# Patient Record
Sex: FEMALE | Race: WHITE | NOT HISPANIC OR LATINO | Employment: UNEMPLOYED | URBAN - METROPOLITAN AREA
[De-identification: names, ages, dates, MRNs, and addresses within clinical notes are randomized per-mention and may not be internally consistent; named-entity substitution may affect disease eponyms.]

---

## 2021-05-27 LAB
ABO GROUP BLD: NORMAL
BLD GP AB SCN SERPL QL: NEGATIVE
HBV SURFACE AG SERPL QL IA: NORMAL
HIV 1+2 AB+HIV1 P24 AG SERPL QL IA: NORMAL
RH BLD: NORMAL
RUBV IGG SERPL IA-ACNC: NORMAL
TREPONEMA PALLIDUM IGG+IGM AB [PRESENCE] IN SERUM OR PLASMA BY IMMUNOASSAY: NON REACTIVE

## 2021-10-06 ENCOUNTER — HOSPITAL ENCOUNTER (OUTPATIENT)
Facility: MEDICAL CENTER | Age: 26
End: 2021-10-06
Attending: SPECIALIST
Payer: COMMERCIAL

## 2021-10-06 PROCEDURE — 87491 CHLMYD TRACH DNA AMP PROBE: CPT

## 2021-10-06 PROCEDURE — 87624 HPV HI-RISK TYP POOLED RSLT: CPT

## 2021-10-06 PROCEDURE — 87591 N.GONORRHOEAE DNA AMP PROB: CPT

## 2021-10-06 PROCEDURE — 88175 CYTOPATH C/V AUTO FLUID REDO: CPT

## 2021-10-08 LAB
C TRACH DNA GENITAL QL NAA+PROBE: NEGATIVE
CYTOLOGY REG CYTOL: NORMAL
HPV HR 12 DNA CVX QL NAA+PROBE: NEGATIVE
HPV16 DNA SPEC QL NAA+PROBE: NEGATIVE
HPV18 DNA SPEC QL NAA+PROBE: NEGATIVE
N GONORRHOEA DNA GENITAL QL NAA+PROBE: NEGATIVE
SPECIMEN SOURCE: NORMAL
SPECIMEN SOURCE: NORMAL

## 2022-02-25 LAB — GLUCOSE 3H P CHAL SERPL-MCNC: 88 MG/DL

## 2022-03-24 ENCOUNTER — HOSPITAL ENCOUNTER (OUTPATIENT)
Facility: MEDICAL CENTER | Age: 27
End: 2022-03-24
Attending: SPECIALIST
Payer: COMMERCIAL

## 2022-03-24 LAB — FIBRONECTIN FETAL SPEC QL: NEGATIVE

## 2022-03-24 PROCEDURE — 82731 ASSAY OF FETAL FIBRONECTIN: CPT

## 2022-04-06 LAB — GP B STREP DNA SPEC QL NAA+PROBE: NEGATIVE

## 2022-04-28 NOTE — H&P
DATE OF ADMISSION:  2022     REASON FOR ADMISSION:  Prodromal labor.     HISTORY OF PRESENT ILLNESS:  This is a 27-year-old  2, para 1 at 39-1/7   weeks' gestation based on last menstrual period consistent with a 9-week   ultrasound, now scheduled for augmentation of labor with Pitocin per protocol   given prodromal labor symptoms.  Risks, benefits and alternatives have been   addressed.  She has asked appropriate questions and wished to proceed forward   with admission at this time.     PAST MEDICAL HISTORY:  Noncontributory.     PAST SURGICAL HISTORY:  Tonsillectomy in .     OBSTETRICAL HISTORY:  In 2016 at 39 weeks gestation, after a 16-hour labor,   the patient underwent a spontaneous vaginal delivery of a 7 pound 8 ounce male   infant.  This is her second pregnancy.     SOCIAL HISTORY:  She denies use of any alcohol, tobacco or recreational drug   use.     MEDICATIONS:  Prenatal vitamins.     ALLERGIES:  No known drug allergies.     PHYSICAL EXAMINATION:   VITAL SIGNS:  She is afebrile, hemodynamically stable.  Current vital signs   can be seen in electronic medical record.  HEART:  Regular rate and rhythm.  CHEST:  Clear to auscultation bilaterally.  ABDOMEN:  Soft, gravid, nontender.  PELVIC:  Sterile vaginal exam 1, 50%, -3 station.  EXTREMITIES:  Nontender.     LABORATORY DATA:  Prenatal care labs are all in order.  She is group B strep   negative.  She did have an abnormal 1-hour glucose tolerance test of 152 with   a normal 3-hour glucose tolerance test.  She has also had multiple urinary   tract infections during the course of her pregnancy and is now just currently   finishing up a recent course of antibiotic with the use of cefdinir.     ASSESSMENT AND PLAN:  A 27-year-old  2, para 1 at term with overall   reassuring fetal status with excellent dates with care since 9 weeks gestation   electing to proceed forward with augmentation of labor with Pitocin per   protocol given  excellent dates and a favorable cervix with Pitocin per   protocol.        ______________________________  MD YVETTE Stark/FRANCY/MARICRUZ    DD:  04/28/2022 13:42  DT:  04/28/2022 14:26    Job#:  [id number]

## 2022-04-30 ENCOUNTER — HOSPITAL ENCOUNTER (EMERGENCY)
Facility: MEDICAL CENTER | Age: 27
End: 2022-04-30
Attending: SPECIALIST | Admitting: SPECIALIST
Payer: COMMERCIAL

## 2022-04-30 VITALS
RESPIRATION RATE: 20 BRPM | OXYGEN SATURATION: 98 % | BODY MASS INDEX: 35.17 KG/M2 | TEMPERATURE: 97.4 F | HEART RATE: 98 BPM | DIASTOLIC BLOOD PRESSURE: 70 MMHG | SYSTOLIC BLOOD PRESSURE: 129 MMHG | WEIGHT: 206 LBS | HEIGHT: 64 IN

## 2022-04-30 LAB
APPEARANCE UR: CLEAR
COLOR UR AUTO: YELLOW
GLUCOSE UR QL STRIP.AUTO: NEGATIVE MG/DL
KETONES UR QL STRIP.AUTO: NEGATIVE MG/DL
LEUKOCYTE ESTERASE UR QL STRIP.AUTO: NEGATIVE
NITRITE UR QL STRIP.AUTO: NEGATIVE
PH UR STRIP.AUTO: 6 [PH] (ref 5–8)
PROT UR QL STRIP: NEGATIVE MG/DL
RBC UR QL AUTO: NEGATIVE
SP GR UR STRIP.AUTO: 1.01 (ref 1–1.03)

## 2022-04-30 PROCEDURE — 81002 URINALYSIS NONAUTO W/O SCOPE: CPT

## 2022-04-30 PROCEDURE — 59025 FETAL NON-STRESS TEST: CPT

## 2022-04-30 PROCEDURE — 302449 STATCHG TRIAGE ONLY (STATISTIC)

## 2022-04-30 NOTE — PROGRESS NOTES
0320 - Pt arrives with c/o lower pelvic pain, onset after visit in office on Thursday but worsening tonight with strong irregular contractions. Pt denies any LOF/bleeding, +FM. EFM connected. Pt also states she is on cefdinir for an ongoing UTI. Urine sample collected. SVE 1/thick/-2, vertex.  0510 - SVE recheck, 2/thick/-2. MD Chacon called and given report of pt, labor status, and FHT, will recheck in 2 hours. PT updated and agrees with plan of care. Pt provided with birthing ball and socks for ambulation.  0700 - Report given to INGA Judd.

## 2022-05-03 ENCOUNTER — HOSPITAL ENCOUNTER (INPATIENT)
Facility: MEDICAL CENTER | Age: 27
LOS: 2 days | End: 2022-05-06
Attending: SPECIALIST | Admitting: SPECIALIST
Payer: COMMERCIAL

## 2022-05-03 PROCEDURE — 85025 COMPLETE CBC W/AUTO DIFF WBC: CPT

## 2022-05-03 PROCEDURE — 36415 COLL VENOUS BLD VENIPUNCTURE: CPT

## 2022-05-03 ASSESSMENT — PAIN SCALES - GENERAL: PAINLEVEL: 0 - NO PAIN

## 2022-05-04 ENCOUNTER — ANESTHESIA EVENT (OUTPATIENT)
Dept: ANESTHESIOLOGY | Facility: MEDICAL CENTER | Age: 27
End: 2022-05-04
Payer: COMMERCIAL

## 2022-05-04 ENCOUNTER — ANESTHESIA (OUTPATIENT)
Dept: ANESTHESIOLOGY | Facility: MEDICAL CENTER | Age: 27
End: 2022-05-04
Payer: COMMERCIAL

## 2022-05-04 LAB
BASOPHILS # BLD AUTO: 0.6 % (ref 0–1.8)
BASOPHILS # BLD: 0.07 K/UL (ref 0–0.12)
EOSINOPHIL # BLD AUTO: 0.11 K/UL (ref 0–0.51)
EOSINOPHIL NFR BLD: 1 % (ref 0–6.9)
ERYTHROCYTE [DISTWIDTH] IN BLOOD BY AUTOMATED COUNT: 42.7 FL (ref 35.9–50)
HCT VFR BLD AUTO: 39.7 % (ref 37–47)
HGB BLD-MCNC: 12.9 G/DL (ref 12–16)
HOLDING TUBE BB 8507: NORMAL
IMM GRANULOCYTES # BLD AUTO: 0.18 K/UL (ref 0–0.11)
IMM GRANULOCYTES NFR BLD AUTO: 1.6 % (ref 0–0.9)
LYMPHOCYTES # BLD AUTO: 2.84 K/UL (ref 1–4.8)
LYMPHOCYTES NFR BLD: 26 % (ref 22–41)
MCH RBC QN AUTO: 28 PG (ref 27–33)
MCHC RBC AUTO-ENTMCNC: 32.5 G/DL (ref 33.6–35)
MCV RBC AUTO: 86.3 FL (ref 81.4–97.8)
MONOCYTES # BLD AUTO: 0.84 K/UL (ref 0–0.85)
MONOCYTES NFR BLD AUTO: 7.7 % (ref 0–13.4)
NEUTROPHILS # BLD AUTO: 6.89 K/UL (ref 2–7.15)
NEUTROPHILS NFR BLD: 63.1 % (ref 44–72)
NRBC # BLD AUTO: 0 K/UL
NRBC BLD-RTO: 0 /100 WBC
PLATELET # BLD AUTO: 283 K/UL (ref 164–446)
PMV BLD AUTO: 11.3 FL (ref 9–12.9)
RBC # BLD AUTO: 4.6 M/UL (ref 4.2–5.4)
WBC # BLD AUTO: 10.9 K/UL (ref 4.8–10.8)

## 2022-05-04 PROCEDURE — 700101 HCHG RX REV CODE 250: Performed by: ANESTHESIOLOGY

## 2022-05-04 PROCEDURE — 62322 NJX INTERLAMINAR LMBR/SAC: CPT | Mod: 59 | Performed by: ANESTHESIOLOGY

## 2022-05-04 PROCEDURE — 10907ZC DRAINAGE OF AMNIOTIC FLUID, THERAPEUTIC FROM PRODUCTS OF CONCEPTION, VIA NATURAL OR ARTIFICIAL OPENING: ICD-10-PCS | Performed by: SPECIALIST

## 2022-05-04 PROCEDURE — 700105 HCHG RX REV CODE 258: Performed by: SPECIALIST

## 2022-05-04 PROCEDURE — 10H07YZ INSERTION OF OTHER DEVICE INTO PRODUCTS OF CONCEPTION, VIA NATURAL OR ARTIFICIAL OPENING: ICD-10-PCS | Performed by: SPECIALIST

## 2022-05-04 PROCEDURE — 700105 HCHG RX REV CODE 258: Performed by: ANESTHESIOLOGY

## 2022-05-04 PROCEDURE — 700111 HCHG RX REV CODE 636 W/ 250 OVERRIDE (IP): Performed by: ANESTHESIOLOGY

## 2022-05-04 PROCEDURE — 0UQMXZZ REPAIR VULVA, EXTERNAL APPROACH: ICD-10-PCS | Performed by: SPECIALIST

## 2022-05-04 PROCEDURE — 59409 OBSTETRICAL CARE: CPT

## 2022-05-04 PROCEDURE — 304965 HCHG RECOVERY SERVICES

## 2022-05-04 PROCEDURE — A9270 NON-COVERED ITEM OR SERVICE: HCPCS | Performed by: SPECIALIST

## 2022-05-04 PROCEDURE — 700111 HCHG RX REV CODE 636 W/ 250 OVERRIDE (IP): Performed by: SPECIALIST

## 2022-05-04 PROCEDURE — 303615 HCHG EPIDURAL/SPINAL ANESTHESIA FOR LABOR

## 2022-05-04 PROCEDURE — 770002 HCHG ROOM/CARE - OB PRIVATE (112)

## 2022-05-04 PROCEDURE — 36415 COLL VENOUS BLD VENIPUNCTURE: CPT

## 2022-05-04 PROCEDURE — 01967 NEURAXL LBR ANES VAG DLVR: CPT | Performed by: ANESTHESIOLOGY

## 2022-05-04 PROCEDURE — 3E033VJ INTRODUCTION OF OTHER HORMONE INTO PERIPHERAL VEIN, PERCUTANEOUS APPROACH: ICD-10-PCS | Performed by: SPECIALIST

## 2022-05-04 PROCEDURE — 700102 HCHG RX REV CODE 250 W/ 637 OVERRIDE(OP): Performed by: SPECIALIST

## 2022-05-04 RX ORDER — ACETAMINOPHEN 500 MG
1000 TABLET ORAL EVERY 6 HOURS PRN
Status: DISCONTINUED | OUTPATIENT
Start: 2022-05-04 | End: 2022-05-06 | Stop reason: HOSPADM

## 2022-05-04 RX ORDER — SODIUM CHLORIDE, SODIUM LACTATE, POTASSIUM CHLORIDE, CALCIUM CHLORIDE 600; 310; 30; 20 MG/100ML; MG/100ML; MG/100ML; MG/100ML
INJECTION, SOLUTION INTRAVENOUS PRN
Status: DISCONTINUED | OUTPATIENT
Start: 2022-05-04 | End: 2022-05-06 | Stop reason: HOSPADM

## 2022-05-04 RX ORDER — VITAMIN A ACETATE, BETA CAROTENE, ASCORBIC ACID, CHOLECALCIFEROL, .ALPHA.-TOCOPHEROL ACETATE, DL-, THIAMINE MONONITRATE, RIBOFLAVIN, NIACINAMIDE, PYRIDOXINE HYDROCHLORIDE, FOLIC ACID, CYANOCOBALAMIN, CALCIUM CARBONATE, FERROUS FUMARATE, ZINC OXIDE, CUPRIC OXIDE 3080; 12; 120; 400; 1; 1.84; 3; 20; 22; 920; 25; 200; 27; 10; 2 [IU]/1; UG/1; MG/1; [IU]/1; MG/1; MG/1; MG/1; MG/1; MG/1; [IU]/1; MG/1; MG/1; MG/1; MG/1; MG/1
1 TABLET, FILM COATED ORAL
Status: DISCONTINUED | OUTPATIENT
Start: 2022-05-04 | End: 2022-05-04

## 2022-05-04 RX ORDER — OXYTOCIN 10 [USP'U]/ML
10 INJECTION, SOLUTION INTRAMUSCULAR; INTRAVENOUS
Status: DISCONTINUED | OUTPATIENT
Start: 2022-05-04 | End: 2022-05-04 | Stop reason: HOSPADM

## 2022-05-04 RX ORDER — VITAMIN A ACETATE, BETA CAROTENE, ASCORBIC ACID, CHOLECALCIFEROL, .ALPHA.-TOCOPHEROL ACETATE, DL-, THIAMINE MONONITRATE, RIBOFLAVIN, NIACINAMIDE, PYRIDOXINE HYDROCHLORIDE, FOLIC ACID, CYANOCOBALAMIN, CALCIUM CARBONATE, FERROUS FUMARATE, ZINC OXIDE, CUPRIC OXIDE 3080; 12; 120; 400; 1; 1.84; 3; 20; 22; 920; 25; 200; 27; 10; 2 [IU]/1; UG/1; MG/1; [IU]/1; MG/1; MG/1; MG/1; MG/1; MG/1; [IU]/1; MG/1; MG/1; MG/1; MG/1; MG/1
1 TABLET, FILM COATED ORAL DAILY
Status: DISCONTINUED | OUTPATIENT
Start: 2022-05-05 | End: 2022-05-06 | Stop reason: HOSPADM

## 2022-05-04 RX ORDER — BUPIVACAINE HYDROCHLORIDE 2.5 MG/ML
INJECTION, SOLUTION EPIDURAL; INFILTRATION; INTRACAUDAL
Status: COMPLETED | OUTPATIENT
Start: 2022-05-04 | End: 2022-05-04

## 2022-05-04 RX ORDER — ONDANSETRON 2 MG/ML
INJECTION INTRAMUSCULAR; INTRAVENOUS
Status: ACTIVE
Start: 2022-05-04 | End: 2022-05-05

## 2022-05-04 RX ORDER — IBUPROFEN 800 MG/1
800 TABLET ORAL EVERY 8 HOURS PRN
Status: DISCONTINUED | OUTPATIENT
Start: 2022-05-04 | End: 2022-05-06 | Stop reason: HOSPADM

## 2022-05-04 RX ORDER — DOCUSATE SODIUM 100 MG/1
100 CAPSULE, LIQUID FILLED ORAL 2 TIMES DAILY PRN
Status: DISCONTINUED | OUTPATIENT
Start: 2022-05-04 | End: 2022-05-06 | Stop reason: HOSPADM

## 2022-05-04 RX ORDER — METHYLERGONOVINE MALEATE 0.2 MG/ML
0.2 INJECTION INTRAVENOUS
Status: DISCONTINUED | OUTPATIENT
Start: 2022-05-04 | End: 2022-05-06 | Stop reason: HOSPADM

## 2022-05-04 RX ORDER — BUPIVACAINE HYDROCHLORIDE 2.5 MG/ML
INJECTION, SOLUTION EPIDURAL; INFILTRATION; INTRACAUDAL
Status: COMPLETED
Start: 2022-05-04 | End: 2022-05-04

## 2022-05-04 RX ORDER — SODIUM CHLORIDE, SODIUM LACTATE, POTASSIUM CHLORIDE, CALCIUM CHLORIDE 600; 310; 30; 20 MG/100ML; MG/100ML; MG/100ML; MG/100ML
INJECTION, SOLUTION INTRAVENOUS CONTINUOUS
Status: DISCONTINUED | OUTPATIENT
Start: 2022-05-04 | End: 2022-05-06 | Stop reason: HOSPADM

## 2022-05-04 RX ORDER — ACETAMINOPHEN 500 MG
1000 TABLET ORAL
Status: DISCONTINUED | OUTPATIENT
Start: 2022-05-04 | End: 2022-05-04 | Stop reason: HOSPADM

## 2022-05-04 RX ORDER — MISOPROSTOL 200 UG/1
600 TABLET ORAL
Status: DISCONTINUED | OUTPATIENT
Start: 2022-05-04 | End: 2022-05-06 | Stop reason: HOSPADM

## 2022-05-04 RX ORDER — SODIUM CHLORIDE, SODIUM LACTATE, POTASSIUM CHLORIDE, AND CALCIUM CHLORIDE .6; .31; .03; .02 G/100ML; G/100ML; G/100ML; G/100ML
1000 INJECTION, SOLUTION INTRAVENOUS
Status: COMPLETED | OUTPATIENT
Start: 2022-05-04 | End: 2022-05-04

## 2022-05-04 RX ORDER — IBUPROFEN 800 MG/1
800 TABLET ORAL
Status: COMPLETED | OUTPATIENT
Start: 2022-05-04 | End: 2022-05-04

## 2022-05-04 RX ORDER — ROPIVACAINE HYDROCHLORIDE 2 MG/ML
INJECTION, SOLUTION EPIDURAL; INFILTRATION; PERINEURAL CONTINUOUS
Status: DISCONTINUED | OUTPATIENT
Start: 2022-05-04 | End: 2022-05-05 | Stop reason: HOSPADM

## 2022-05-04 RX ORDER — TERBUTALINE SULFATE 1 MG/ML
0.25 INJECTION, SOLUTION SUBCUTANEOUS
Status: DISCONTINUED | OUTPATIENT
Start: 2022-05-04 | End: 2022-05-04 | Stop reason: HOSPADM

## 2022-05-04 RX ORDER — OXYCODONE HYDROCHLORIDE 5 MG/1
5 TABLET ORAL EVERY 4 HOURS PRN
Status: DISCONTINUED | OUTPATIENT
Start: 2022-05-04 | End: 2022-05-06 | Stop reason: HOSPADM

## 2022-05-04 RX ORDER — SODIUM CHLORIDE, SODIUM LACTATE, POTASSIUM CHLORIDE, AND CALCIUM CHLORIDE .6; .31; .03; .02 G/100ML; G/100ML; G/100ML; G/100ML
250 INJECTION, SOLUTION INTRAVENOUS PRN
Status: DISCONTINUED | OUTPATIENT
Start: 2022-05-04 | End: 2022-05-04 | Stop reason: HOSPADM

## 2022-05-04 RX ORDER — ONDANSETRON 2 MG/ML
4 INJECTION INTRAMUSCULAR; INTRAVENOUS EVERY 4 HOURS PRN
Status: DISCONTINUED | OUTPATIENT
Start: 2022-05-04 | End: 2022-05-06 | Stop reason: HOSPADM

## 2022-05-04 RX ORDER — LIDOCAINE HYDROCHLORIDE AND EPINEPHRINE 15; 5 MG/ML; UG/ML
INJECTION, SOLUTION EPIDURAL
Status: COMPLETED | OUTPATIENT
Start: 2022-05-04 | End: 2022-05-04

## 2022-05-04 RX ADMIN — SODIUM CHLORIDE, POTASSIUM CHLORIDE, SODIUM LACTATE AND CALCIUM CHLORIDE: 600; 310; 30; 20 INJECTION, SOLUTION INTRAVENOUS at 10:19

## 2022-05-04 RX ADMIN — SODIUM CHLORIDE, POTASSIUM CHLORIDE, SODIUM LACTATE AND CALCIUM CHLORIDE 250 ML: 600; 310; 30; 20 INJECTION, SOLUTION INTRAVENOUS at 14:00

## 2022-05-04 RX ADMIN — SODIUM CHLORIDE, POTASSIUM CHLORIDE, SODIUM LACTATE AND CALCIUM CHLORIDE: 600; 310; 30; 20 INJECTION, SOLUTION INTRAVENOUS at 01:03

## 2022-05-04 RX ADMIN — BUPIVACAINE HYDROCHLORIDE 7 ML: 2.5 INJECTION, SOLUTION EPIDURAL; INFILTRATION; INTRACAUDAL; PERINEURAL at 09:26

## 2022-05-04 RX ADMIN — IBUPROFEN 800 MG: 800 TABLET, FILM COATED ORAL at 20:00

## 2022-05-04 RX ADMIN — OXYTOCIN 125 ML/HR: 10 INJECTION, SOLUTION INTRAMUSCULAR; INTRAVENOUS at 16:58

## 2022-05-04 RX ADMIN — ROPIVACAINE HYDROCHLORIDE: 2 INJECTION, SOLUTION EPIDURAL; INFILTRATION at 10:04

## 2022-05-04 RX ADMIN — ONDANSETRON 4 MG: 2 INJECTION INTRAMUSCULAR; INTRAVENOUS at 14:27

## 2022-05-04 RX ADMIN — SODIUM CHLORIDE, POTASSIUM CHLORIDE, SODIUM LACTATE AND CALCIUM CHLORIDE 250 ML: 600; 310; 30; 20 INJECTION, SOLUTION INTRAVENOUS at 14:15

## 2022-05-04 RX ADMIN — IBUPROFEN 800 MG: 800 TABLET, FILM COATED ORAL at 19:59

## 2022-05-04 RX ADMIN — Medication 2000 ML/HR: at 16:12

## 2022-05-04 RX ADMIN — LIDOCAINE HYDROCHLORIDE,EPINEPHRINE BITARTRATE 5 ML: 15; .005 INJECTION, SOLUTION EPIDURAL; INFILTRATION; INTRACAUDAL; PERINEURAL at 09:26

## 2022-05-04 RX ADMIN — SODIUM CHLORIDE, POTASSIUM CHLORIDE, SODIUM LACTATE AND CALCIUM CHLORIDE: 600; 310; 30; 20 INJECTION, SOLUTION INTRAVENOUS at 14:28

## 2022-05-04 RX ADMIN — OXYTOCIN 1 MILLI-UNITS/MIN: 10 INJECTION, SOLUTION INTRAMUSCULAR; INTRAVENOUS at 01:03

## 2022-05-04 RX ADMIN — SODIUM CHLORIDE, POTASSIUM CHLORIDE, SODIUM LACTATE AND CALCIUM CHLORIDE 1000 ML: 600; 310; 30; 20 INJECTION, SOLUTION INTRAVENOUS at 09:00

## 2022-05-04 ASSESSMENT — PATIENT HEALTH QUESTIONNAIRE - PHQ9
1. LITTLE INTEREST OR PLEASURE IN DOING THINGS: NOT AT ALL
2. FEELING DOWN, DEPRESSED, IRRITABLE, OR HOPELESS: NOT AT ALL
SUM OF ALL RESPONSES TO PHQ9 QUESTIONS 1 AND 2: 0

## 2022-05-04 ASSESSMENT — PAIN DESCRIPTION - PAIN TYPE
TYPE: ACUTE PAIN

## 2022-05-04 ASSESSMENT — PAIN SCALES - GENERAL: PAIN_LEVEL: 2

## 2022-05-04 ASSESSMENT — LIFESTYLE VARIABLES: EVER_SMOKED: NEVER

## 2022-05-04 NOTE — PROGRESS NOTES
0700- report received from Reba Tran  0817- Dr guadalupe bedside   0905- patient request epidural  0910- called dr Gunter for epidural  0948- maternal hypotension noted, improved with position changes, RN bedside until 1026  1320-recurrent variable decelerations noted, position changes attempted  1330- dr chacon called for update, informed him of patient progress  1400- recurrent variable decelerations noted despite position change, performed SVE, patient determined to be 7/100/-1 same as check from previous hour,   1455- 10/100/0, complete, collins updated- ordered to start pushing and call him when close  1514- started pushing  1549- called collins, left voicemail  1556- called collins again, collins answered, said he is on his way  1605- collins bedside

## 2022-05-04 NOTE — ANESTHESIA PREPROCEDURE EVALUATION
Date: 22  Procedure: Labor Epidural       , active labor.     Relevant Problems   No relevant active problems       Physical Exam    Airway   Mallampati: II  TM distance: >3 FB  Neck ROM: full       Cardiovascular - normal exam  Rhythm: regular  Rate: normal  (-) murmur     Dental - normal exam           Pulmonary - normal exam  Breath sounds clear to auscultation     Abdominal    Neurological - normal exam                 Anesthesia Plan    ASA 2       Plan - epidural   Neuraxial block will be labor analgesia                  Pertinent diagnostic labs and testing reviewed    Informed Consent:    Anesthetic plan and risks discussed with patient.

## 2022-05-04 NOTE — PROGRESS NOTES
Pt admitted to unit for scheduled elective induction of labor. Pt is , EDC 2022, 39w2d. Pt oriented to room and use of call light. EFM placed. Pt encouraged to voice concerns.   1046 Provider notified, induction orders received.  0600 Update to provider; pt still comfortable. Contractions mild and irregular; no cervical exam since admission; Provider will come and assess pt later this AM; orders received to continue Pitocin @2Mu/min.  0700 Report to INGA Meza and Inez.

## 2022-05-04 NOTE — PROGRESS NOTES
"Progress Note    Subjective:   Doing well. No issues or concerns. Pain well controlled with the uterine contractions thus far but she is interested in an epidural when her contractions start to become intolerable     Objective Data:  Recent Labs     05/03/22  2330   WBC 10.9*   RBC 4.60   HEMOGLOBIN 12.9   HEMATOCRIT 39.7   MCV 86.3   MCH 28.0   MCHC 32.5*   RDW 42.7   PLATELETCT 283   MPV 11.3           Vitals:    05/03/22 2314 05/04/22 0000 05/04/22 0500 05/04/22 0701   BP: 125/63  104/63 105/62   Pulse: 96  85 85   Resp: 17      Temp: 36.1 °C (97 °F)   36.6 °C (97.8 °F)   TempSrc: Temporal   Temporal   Weight:  93.4 kg (206 lb)     Height:  1.626 m (5' 4\")       Abdomen: soft gravid non tender  SVE: 3cm/ 50%/-2 Vtx AROM clear fluid IUPC placed  Ext: non tender calves    FHTs: 150s with GBTBV   [place]: q 1- 3min    No intake or output data in the 24 hours ending 05/04/22 0832    Current Facility-Administered Medications   Medication Dose Route Frequency Provider Last Rate Last Admin   • LR infusion   Intravenous Continuous Corey Chong M.D. 125 mL/hr at 05/04/22 0500 Rate Change at 05/04/22 0500   • terbutaline (BRETHINE) injection 0.25 mg  0.25 mg Subcutaneous Once PRN Corey Chong M.D.       • oxytocin (PITOCIN) infusion (for post delivery)  2,000 mL/hr Intravenous Once Corey Chong M.D.   Held at 05/04/22 0030    Followed by   • oxytocin (PITOCIN) infusion (for post delivery)  125 mL/hr Intravenous Continuous Corey Chong M.D.   Held at 05/04/22 0030   • oxytocin (PITOCIN) injection 10 Units  10 Units Intramuscular Once PRN Corey Chong M.D.       • ibuprofen (MOTRIN) tablet 800 mg  800 mg Oral Once PRN Corey Chong M.D.       • acetaminophen (TYLENOL) tablet 1,000 mg  1,000 mg Oral Once PRN Corey Chong M.D.       • oxytocin (PITOCIN) 20 UNITS/1000ML LR (induction of labor)  0.5-20 terrell-units/min Intravenous Continuous Corey Chong M.D. 30 mL/hr at 05/04/22 0715 10 " terrell-units/min at 22 0715       A/P 26 yo  at term with overall reassuring fetal status now on Pitocin augmentation and S/P AROM with IUPC placed. Plan to continue to increase the Pitocin per protocol and present management.

## 2022-05-04 NOTE — CARE PLAN
The patient is Stable - Low risk of patient condition declining or worsening    Shift Goals  Clinical Goals: labor progress  Patient Goals: pain control  Family Goals: support patient    Progress made toward(s) clinical / shift goals:      Problem: Risk for Infection and Impaired Wound Healing  Goal: Patient will remain free from infection  Outcome: Progressing     Problem: Pain  Goal: Patient's pain will be alleviated or reduced to the patient’s comfort goal  Outcome: Progressing

## 2022-05-04 NOTE — OR SURGEON
Immediate Delivery Note        Estimated Blood Loss: 300 ccs    Findings:  over bilateral labial lacerations with double nuchal cord with easy delivery of the shoulders and body with the placenta spontaneous and intact with 3vc with Apgars of 8/9 at one and five minutes respectively in this vigorous male infant. The lacerations were repaired with 4.0 Vicryl with single interrupted sutures    Complications: None        2022 4:18 PM Corey Chong M.D.

## 2022-05-04 NOTE — ANESTHESIA PROCEDURE NOTES
Epidural Block    Date/Time: 5/4/2022 9:26 AM  Performed by: Haseeb Gunter M.D.  Authorized by: Haseeb Gunter M.D.     Patient Location:  OB  Start Time:  5/4/2022 9:26 AM  Reason for Block: labor analgesia    patient identified, IV checked, site marked, risks and benefits discussed, surgical consent, monitors and equipment checked, pre-op evaluation and timeout performed    Patient Position:  Sitting  Prep: ChloraPrep, patient draped and sterile technique    Monitoring:  Blood pressure, continuous pulse oximetry and heart rate  Approach:  Midline  Location:  L3-L4  Injection Technique:  RICARDO saline  Skin infiltration:  Lidocaine  Strength:  1%  Dose:  3ml  Needle Type:  Tuohy  Needle Gauge:  17 G  Needle Length:  3.5 in  Loss of resistance::  5.5  Catheter Size:  19 G  Catheter at Skin Depth:  11  Test Dose Result:  Negative

## 2022-05-05 LAB
ERYTHROCYTE [DISTWIDTH] IN BLOOD BY AUTOMATED COUNT: 41.4 FL (ref 35.9–50)
HCT VFR BLD AUTO: 33.9 % (ref 37–47)
HGB BLD-MCNC: 11.2 G/DL (ref 12–16)
MCH RBC QN AUTO: 28.2 PG (ref 27–33)
MCHC RBC AUTO-ENTMCNC: 33 G/DL (ref 33.6–35)
MCV RBC AUTO: 85.4 FL (ref 81.4–97.8)
PLATELET # BLD AUTO: 274 K/UL (ref 164–446)
PMV BLD AUTO: 10.8 FL (ref 9–12.9)
RBC # BLD AUTO: 3.97 M/UL (ref 4.2–5.4)
WBC # BLD AUTO: 17 K/UL (ref 4.8–10.8)

## 2022-05-05 PROCEDURE — 85027 COMPLETE CBC AUTOMATED: CPT

## 2022-05-05 PROCEDURE — 770002 HCHG ROOM/CARE - OB PRIVATE (112)

## 2022-05-05 PROCEDURE — 36415 COLL VENOUS BLD VENIPUNCTURE: CPT

## 2022-05-05 PROCEDURE — 700102 HCHG RX REV CODE 250 W/ 637 OVERRIDE(OP): Performed by: SPECIALIST

## 2022-05-05 PROCEDURE — A9270 NON-COVERED ITEM OR SERVICE: HCPCS | Performed by: SPECIALIST

## 2022-05-05 RX ADMIN — IBUPROFEN 800 MG: 800 TABLET, FILM COATED ORAL at 19:05

## 2022-05-05 RX ADMIN — PRENATAL WITH FERROUS FUM AND FOLIC ACID 1 TABLET: 3080; 920; 120; 400; 22; 1.84; 3; 20; 10; 1; 12; 200; 27; 25; 2 TABLET ORAL at 05:49

## 2022-05-05 RX ADMIN — ACETAMINOPHEN 1000 MG: 500 TABLET ORAL at 00:01

## 2022-05-05 RX ADMIN — IBUPROFEN 800 MG: 800 TABLET, FILM COATED ORAL at 05:49

## 2022-05-05 ASSESSMENT — EDINBURGH POSTNATAL DEPRESSION SCALE (EPDS)
I HAVE LOOKED FORWARD WITH ENJOYMENT TO THINGS: AS MUCH AS I EVER DID
I HAVE BEEN ABLE TO LAUGH AND SEE THE FUNNY SIDE OF THINGS: AS MUCH AS I ALWAYS COULD
I HAVE BEEN SO UNHAPPY THAT I HAVE HAD DIFFICULTY SLEEPING: NOT AT ALL
I HAVE BEEN SO UNHAPPY THAT I HAVE BEEN CRYING: NO, NEVER
I HAVE FELT SAD OR MISERABLE: NO, NOT AT ALL
THE THOUGHT OF HARMING MYSELF HAS OCCURRED TO ME: NEVER
I HAVE BEEN ANXIOUS OR WORRIED FOR NO GOOD REASON: NO, NOT AT ALL
THINGS HAVE BEEN GETTING ON TOP OF ME: NO, I HAVE BEEN COPING AS WELL AS EVER
I HAVE BLAMED MYSELF UNNECESSARILY WHEN THINGS WENT WRONG: NO, NEVER
I HAVE FELT SCARED OR PANICKY FOR NO GOOD REASON: NO, NOT MUCH

## 2022-05-05 ASSESSMENT — PAIN DESCRIPTION - PAIN TYPE
TYPE: ACUTE PAIN
TYPE: ACUTE PAIN

## 2022-05-05 NOTE — DISCHARGE SUMMARY
DATE OF ADMISSION:  2022   DATE OF DISCHARGE:  2022     DISCHARGE DIAGNOSES:  1.  Status post a spontaneous vaginal delivery.  2.  Uncomplicated postpartum course.     HISTORY OF PRESENT ILLNESS:  This is a 27-year-old  2, para 1 at 39 and   1/7th weeks' gestation with prodromal symptoms, electing to proceed forward   with Pitocin augmentation.  Risks, benefits and alternatives have been   addressed.  She has asked appropriate questions and wished to proceed forward   with admission at this time.     PAST MEDICAL HISTORY AND PHYSICAL EXAMINATION:  Can be found in dictated   history and physical.     ASSESSMENT AND PLAN:  A 27-year-old  2, para 1 at term with overall   reassuring fetal status electing to proceed forward with Pitocin augmentation.     HOSPITAL COURSE:  As stated above, the patient was admitted.  She was started   on Pitocin, increased per protocol. At 3 cm dilation, she did have an   artificial rupture of membranes with clear amniotic fluid.  She was granted   continuous lumbar epidural to optimize pain management, progressed well during   the course of her labor, subsequently underwent a spontaneous vaginal   delivery.  Apgars were 8 and 9 at 1 and 5 minutes respectively with an   estimated blood loss of 300 mL.     On postpartum day #1, she was ambulating, voiding well, tolerating a regular   diet.  Her pain was well controlled with ibuprofen alone.  She was   breastfeeding well.  She had only minimal lochia rubra and was felt to be   appropriate for discharge.     DISCHARGE PLAN:  Follow up in 6 weeks.     DISCHARGE MEDICATIONS:  Motrin.     DISCHARGE INSTRUCTIONS:  She is to call with any increased temperature greater   than 100.4, increasing vaginal bleeding, abdominal pain unrelieved with any   p.o. pain medication.  Call with any other questions or concerns.        ______________________________  MD YVETTE Stark/SHERLEY    DD:  2022 09:44  DT:  2022  10:05    Job#:  [id number]

## 2022-05-05 NOTE — PROGRESS NOTES
Patient admitted to postpartum room 319.  Pt A&Ox4, VSS.  Fundus firm, lochia light.  Bedside report received from L&D RN, Reba. Parent/infant ID bands verified. Cuddles on and flashing. PIV flushed and second bag of oxytocin running.  Oriented patient and significant other to postpartum unit, call light/emergency light, plan of care and paperwork to be completed. Provided education on breastfeeding cues, feeding frequency, safe infant sleep and discussed POC for infant.   Patient and significant other verbalized understanding.

## 2022-05-05 NOTE — PROGRESS NOTES
Progress Note    Subjective:   Doing well. No issues or concerns. Min vaginal bleeding. Pain well controlled with Ibuprofen. BF well.    Objective Data:  Recent Labs     05/03/22  2330 05/05/22  0435   WBC 10.9* 17.0*   RBC 4.60 3.97*   HEMOGLOBIN 12.9 11.2*   HEMATOCRIT 39.7 33.9*   MCV 86.3 85.4   MCH 28.0 28.2   MCHC 32.5* 33.0*   RDW 42.7 41.4   PLATELETCT 283 274   MPV 11.3 10.8           Vitals:    05/04/22 2030 05/04/22 2200 05/05/22 0200 05/05/22 0600   BP: 118/75 (!) 97/59 102/66 114/71   Pulse: 99 80 78 80   Resp: 18 18 18 18   Temp: 36.9 °C (98.5 °F) 37.1 °C (98.8 °F) 36.6 °C (97.8 °F) 36.7 °C (98.1 °F)   TempSrc: Temporal Temporal Temporal Temporal   SpO2: 95% 96% 97% 97%   Weight:       Height:         Abdomen: soft firm non tender  Perineum: no sig lochia rubra on pad  Ext: non tender calves    Intake/Output Summary (Last 24 hours) at 5/5/2022 0940  Last data filed at 5/4/2022 1800  Gross per 24 hour   Intake --   Output 1750 ml   Net -1750 ml       Current Facility-Administered Medications   Medication Dose Route Frequency Provider Last Rate Last Admin   • LR infusion   Intravenous Continuous Corey Chong M.D. 125 mL/hr at 05/04/22 1428 New Bag at 05/04/22 1428   • oxytocin (PITOCIN) infusion (for post delivery)  125 mL/hr Intravenous Continuous Corey Chong M.D. 125 mL/hr at 05/04/22 1658 125 mL/hr at 05/04/22 1658   • oxytocin (PITOCIN) 20 UNITS/1000ML LR (induction of labor)  0.5-20 terrell-units/min Intravenous Continuous Corey Chong M.D. 18 mL/hr at 05/04/22 1500 6 terrell-units/min at 05/04/22 1500   • ondansetron (ZOFRAN) syringe/vial injection 4 mg  4 mg Intravenous Q4HRS PRN Corey Chong M.D.   4 mg at 05/04/22 1427   • lactated ringers infusion   Intravenous PRN Corey Chong M.D.       • docusate sodium (COLACE) capsule 100 mg  100 mg Oral BID PRN Corey Chong M.D.       • ibuprofen (MOTRIN) tablet 800 mg  800 mg Oral Q8HRS PRN Corey Chong M.D.   800 mg at 05/05/22  0549   • acetaminophen (TYLENOL) tablet 1,000 mg  1,000 mg Oral Q6HRS PRN Corey Chong M.D.   1,000 mg at 22 0001   • tetanus-dipth-acell pertussis (Tdap) inj 0.5 mL  0.5 mL Intramuscular Once PRN Corey Chong M.D.       • PRN oxytocin (PITOCIN) (20 Units/1000 mL) PRN for excessive uterine bleeding - See Admin Instr  125-999 mL/hr Intravenous Once PRN Corey Chong M.D.       • miSOPROStol (CYTOTEC) tablet 600 mcg  600 mcg Rectal Once PRN Corey Chong M.D.       • methylergonovine (METHERGINE) injection 0.2 mg  0.2 mg Intramuscular Once PRN Corey Chong M.D.       • oxyCODONE immediate-release (ROXICODONE) tablet 5 mg  5 mg Oral Q4HRS PRN Corey Chong M.D.       • prenatal plus vitamin (STUARTNATAL 1+1) 27-1 MG tablet 1 Tablet  1 Tablet Oral DAILY Corey Chong M.D.   1 Tablet at 22 0549       A/P S/P  now PPD #1. Plan to proceed with the usual pp management and will discharge home today. F/u in 6 weeks.

## 2022-05-05 NOTE — CARE PLAN
The patient is Stable - Low risk of patient condition declining or worsening    Shift Goals  Clinical Goals: Firm fundus, light lochia  Patient Goals: Pain control  Family Goals: support patient    Progress made toward(s) clinical / shift goals:    Problem: Knowledge Deficit - Postpartum  Goal: Patient will verbalize and demonstrate understanding of self and infant care  Outcome: Progressing  Note: Patient bonding with infant and breastfeeding well.      Problem: Altered Physiologic Condition  Goal: Patient physiologically stable as evidenced by normal lochia, palpable uterine involution and vitals within normal limits  Outcome: Progressing  Note: Fundus firm, lochia light. VSS.     Problem: Infection - Postpartum  Goal: Postpartum patient will be free of signs and symptoms of infection  Outcome: Progressing       Patient is not progressing towards the following goals: n/a

## 2022-05-05 NOTE — CONSULTS
JACKY is an experienced breastfeeding mother who breast fed her first baby for 18 months.  MOB reported this baby is latching onto the breast without difficulty and is breastfeeding without concern.  MOB denied pain at breasts with latch.  Lactation assistance was offered, but JACKY declined.    Breastfeeding Plan:   Continue to offer infant the breast per feeding cues for a minimum of 8 or more feeds in a 24 hour period.    JACKY was provided with the [facility] breastfeeding resource handout.    JACKY denied having any lactation questions and/or concerns at this time and was encouraged to call for lactation assistance as needed.

## 2022-05-05 NOTE — ANESTHESIA TIME REPORT
Anesthesia Start and Stop Event Times     Date Time Event    5/4/2022 0918 Ready for Procedure     0918 Anesthesia Start     1608 Anesthesia Stop        Responsible Staff  05/04/22    Name Role Begin End    Haseeb Gunter M.D. Anesth 0918 1608        Overtime Reason:  no overtime (within assigned shift)    Comments:

## 2022-05-05 NOTE — ANESTHESIA POSTPROCEDURE EVALUATION
Patient: Allyson Flores    Procedure Summary     Date: 05/04/22 Room / Location:     Anesthesia Start: 0918 Anesthesia Stop: 1608    Procedure: Labor Epidural Diagnosis:     Scheduled Providers:  Responsible Provider: Haseeb Gunter M.D.    Anesthesia Type: epidural ASA Status: 2          Final Anesthesia Type: epidural  Last vitals  BP   Blood Pressure: (!) 97/59 (rn notified)    Temp   37.1 °C (98.8 °F)    Pulse   80   Resp   18    SpO2   96 %      Anesthesia Post Evaluation    Patient location during evaluation: PACU  Patient participation: complete - patient participated  Level of consciousness: awake and alert  Pain score: 2    Airway patency: patent  Anesthetic complications: no  Cardiovascular status: hemodynamically stable  Respiratory status: acceptable  Hydration status: euvolemic    PONV: none          There were no known complications for this encounter.     Nurse Pain Score: 2 (NPRS)

## 2022-05-06 VITALS
OXYGEN SATURATION: 96 % | HEIGHT: 64 IN | HEART RATE: 87 BPM | RESPIRATION RATE: 18 BRPM | DIASTOLIC BLOOD PRESSURE: 67 MMHG | BODY MASS INDEX: 35.17 KG/M2 | SYSTOLIC BLOOD PRESSURE: 112 MMHG | TEMPERATURE: 97.5 F | WEIGHT: 206 LBS

## 2022-05-06 PROCEDURE — A9270 NON-COVERED ITEM OR SERVICE: HCPCS | Performed by: SPECIALIST

## 2022-05-06 PROCEDURE — 700102 HCHG RX REV CODE 250 W/ 637 OVERRIDE(OP): Performed by: SPECIALIST

## 2022-05-06 RX ADMIN — PRENATAL WITH FERROUS FUM AND FOLIC ACID 1 TABLET: 3080; 920; 120; 400; 22; 1.84; 3; 20; 10; 1; 12; 200; 27; 25; 2 TABLET ORAL at 08:46

## 2022-05-06 RX ADMIN — IBUPROFEN 800 MG: 800 TABLET, FILM COATED ORAL at 16:55

## 2022-05-06 NOTE — DISCHARGE SUMMARY
DATE OF ADMISSION:  2022     ADDENDUM     This is an addendum to previously dictated discharge summary.     DISCHARGE DIAGNOSES:  1.  Status post spontaneous vaginal delivery.  2.  Uncomplicated postpartum course.     HISTORY OF PRESENT ILLNESS:  Briefly, this is a 27-year-old  2, para 1   at term with overall reassuring fetal status, who was admitted and did undergo   a normal spontaneous vaginal delivery.  On postpartum day #1, she had met all   discharge criteria; however, the infant was not discharged and thus the   discharge was canceled later on in the evening.  The patient continues to do   well.  She denies any significant lochia rubra.  She is now breastfeeding   better with lactational assistance.  Pain is well controlled with Motrin,   ibuprofen alone.  As stated above, she continues to do well and again has met   discharge criteria once again and wishes to go home with discharge plan follow   up in 6 weeks.     DISCHARGE INSTRUCTIONS:  She is to call with any increased temperature greater   than 100.4, increasing vaginal bleeding, abdominal pain unrelieved with any   p.o. pain medication.  Call with any other questions or concerns.        ______________________________  MD YVETTE Stark/BRIAN    DD:  2022 08:21  DT:  2022 09:00    Job#:  [id number]

## 2022-05-06 NOTE — PROGRESS NOTES
Progress Note    Subjective:   Doing well. Still working on BF. She had wished to stay as her baby was not discharged yesterday. No sig bleeding. No concerns this am.    Objective Data:  Recent Labs     05/03/22  2330 05/05/22  0435   WBC 10.9* 17.0*   RBC 4.60 3.97*   HEMOGLOBIN 12.9 11.2*   HEMATOCRIT 39.7 33.9*   MCV 86.3 85.4   MCH 28.0 28.2   MCHC 32.5* 33.0*   RDW 42.7 41.4   PLATELETCT 283 274   MPV 11.3 10.8           Vitals:    05/05/22 0200 05/05/22 0600 05/05/22 1727 05/06/22 0600   BP: 102/66 114/71 119/69 112/67   Pulse: 78 80 90 87   Resp: 18 18 18 18   Temp: 36.6 °C (97.8 °F) 36.7 °C (98.1 °F) 36.9 °C (98.4 °F) 36.4 °C (97.5 °F)   TempSrc: Temporal Temporal Temporal Temporal   SpO2: 97% 97% 92% 96%   Weight:       Height:         ABdomen: soft non tender fundus  Perineum: no sig bleeding or discharge  Ext: non tender calves    No intake or output data in the 24 hours ending 05/06/22 0818    Current Facility-Administered Medications   Medication Dose Route Frequency Provider Last Rate Last Admin   • LR infusion   Intravenous Continuous Corey Chong M.D. 125 mL/hr at 05/04/22 1428 New Bag at 05/04/22 1428   • oxytocin (PITOCIN) infusion (for post delivery)  125 mL/hr Intravenous Continuous Corey Chong M.D. 125 mL/hr at 05/04/22 1658 125 mL/hr at 05/04/22 1658   • oxytocin (PITOCIN) 20 UNITS/1000ML LR (induction of labor)  0.5-20 terrell-units/min Intravenous Continuous Corey Chong M.D. 18 mL/hr at 05/04/22 1500 6 terrell-units/min at 05/04/22 1500   • ondansetron (ZOFRAN) syringe/vial injection 4 mg  4 mg Intravenous Q4HRS PRN Corey Chong M.D.   4 mg at 05/04/22 1427   • lactated ringers infusion   Intravenous PRN Corey Chong M.D.       • docusate sodium (COLACE) capsule 100 mg  100 mg Oral BID PRN Corey Chong M.D.       • ibuprofen (MOTRIN) tablet 800 mg  800 mg Oral Q8HRS PRN Corey Chong M.D.   800 mg at 05/05/22 1905   • acetaminophen (TYLENOL) tablet 1,000 mg  1,000 mg  Oral Q6HRS PRN Corey Chong M.D.   1,000 mg at 22 0001   • tetanus-dipth-acell pertussis (Tdap) inj 0.5 mL  0.5 mL Intramuscular Once PRN Corey Chong M.D.       • PRN oxytocin (PITOCIN) (20 Units/1000 mL) PRN for excessive uterine bleeding - See Admin Instr  125-999 mL/hr Intravenous Once PRN Corey Cohng M.D.       • miSOPROStol (CYTOTEC) tablet 600 mcg  600 mcg Rectal Once PRN Corey Chong M.D.       • methylergonovine (METHERGINE) injection 0.2 mg  0.2 mg Intramuscular Once PRN Corey hCong M.D.       • oxyCODONE immediate-release (ROXICODONE) tablet 5 mg  5 mg Oral Q4HRS PRN Corey Chong M.D.       • prenatal plus vitamin (STUARTNATAL 1+1) 27-1 MG tablet 1 Tablet  1 Tablet Oral DAILY Corey Chong M.D.   1 Tablet at 22 0549       A/P S/P  now PPD #1. Plan to proceed with the discharge home today. All questions were answered.

## 2022-05-06 NOTE — CARE PLAN
The patient is Stable - Low risk of patient condition declining or worsening    Shift Goals  Clinical Goals: Maintain fundus firm, lochia light; pain management  Patient Goals: Pain control  Family Goals: support patient    Progress made toward(s) clinical / shift goals:  Fundus firm, lochia light; patient denied need for pain medication throughout shift.

## 2022-05-06 NOTE — LACTATION NOTE
This note was copied from a baby's chart.  Lactation follow-up visit:    Baby 39.2 weeks, , baby's Wt loss 5.5%. MOB reports baby was breastfeeding well yesterday, baby is now refusing breast (arching back, pulling off after few sucks, batting at breast), couplet to be discharged today. Parents provided bottle x 1 & pacifier last night. Educated parents on risks of pacifier & slow pace bottle feedings.     LC attempted to latch baby several times, baby refusing breast. Encouraged mother to keep baby STS while awake and offer breast when baby shows early signs of hunger. Educated mother to hand express or pump after each breastfeed & feed back EBM, via syringe or spoon.     Parents live in [facility], MOB plans to F/U with Hospital in area for breastfeeding support.     Breastfeeding plan:  Breastfeed on cue, breastfeed a minimum 8 or more times in 24 hours no longer than 4 hours from last feed. Hand express or pump after each breastfeed & feed back EBM.

## 2022-05-06 NOTE — CARE PLAN
The patient is Stable - Low risk of patient condition declining or worsening    Shift Goals  Clinical Goals: adequate pain management  Patient Goals: pain control  Family Goals: support patient    Progress made toward(s) clinical / shift goals:  pain well controlled, knowledge of PP care and infant care verbalized, and feeling well.     Patient is not progressing towards the following goals:

## 2022-05-06 NOTE — PROGRESS NOTES
1600- Dr. Kitchen at patient bedside discussed infant plan.     1619- MD will discharge infant.    1640- Discharge paperwork discussed with parents at bedside. All questions answered and discussed. Follow-up appointment. NBS #2 dates given to parents. Parents verbalize understanding. Paperwork signed and dated at this time.    1715- Infant discharged in car seat with parents. Infant placed in car seat by parents and checked by RN. Bands verified. Cuddles removed. Patient declined wheelchair and wanted to walk. All questions answered at this time.

## 2022-05-06 NOTE — PROGRESS NOTES
0629- Bedside report received from INGA Mae.  Patient denied needs.  0905- Patient assessment done.  Patient reported she is voiding without difficulty and passing flatus.  Patient denied dizziness and reported she is walking without difficulty.  Discussed pain management plan.  Reviewed plan of care.  Patient verbalized understanding.  FOB at bedside.  1740- Dr. Chong notified that patient desires to stay until tomorrow due to her infant not being discharged this evening.  1840- Patient stated she read the written patient discharge education/instruction sheet and has no questions.   none

## 2022-05-06 NOTE — CARE PLAN
Problem: Psychosocial - Postpartum  Goal: Patient will verbalize and demonstrate effective bonding and parenting behavior  Outcome: Progressing   The patient is Stable - Low risk of patient condition declining or worsening    Shift Goals  Clinical Goals: Maintain fundus firm, lochia light; pain management  Patient Goals: Pain control  Family Goals: support patient    Progress made toward(s) clinical / shift goals:  **Pt demonstrates appropriate bonding and parenting behavior  *    Patient is not progressing towards the following goals:

## 2022-05-06 NOTE — DISCHARGE INSTRUCTIONS
PATIENT DISCHARGE EDUCATION INSTRUCTION SHEET  REASONS TO CALL YOUR OBSTETRICIAN  · Persistent fever, shaking, chills (Temperature higher than 100.4) may indicate you have an infection  · Heavy bleeding: soaking more than 1 pad per hour; Passing clots an egg-sized clot or bigger may mean you have an postpartum hemorrhage  · Foul odor from vagina or bad smelling or discolored discharge or blood  · Breast infection (Mastitis symptoms); breast pain, chills, fever, redness or red streaks, may feel flu like symptoms  · Urinary pain, burning or frequency  · Incision that is not healing, increased redness, swelling, tenderness or pain, or any pus from episiotomy or  site may mean you have an infection  · Redness, swelling, warmth, or painful to touch in the calf area of your leg may mean you have a blood clot  · Severe or intensified depression, thoughts or feelings of wanting to hurt yourself or someone else   · Pain in chest, obstructed breathing or shortness of breath (trouble catching your breath) may mean you are having a postpartum complication. Call your provider immediately   · Headache that does not get better, even after taking medicine, a bad headache with vision changes or pain in the upper right area of your belly may mean you have high blood pressure or post birth preeclampsia. Call your provider immediately    HAND WASHING  All family and friends should wash their hands:  · Before and after holding the baby  · Before feeding the baby  · After using the restroom or changing the baby's diaper    WOUND CARE  Ask your physician for additional care instructions. In general:  ·  Incision:  · May shower and pat incision dry   · Keep the incision clean and dry  · There should not be any opening or pus from the incision  · Continue to walk at home 3 times a day   · Do NOT lift anything heavier than your baby (over 10 pounds)  · Encourage family to help participate in care of the  to allow  rest and mom time to heal  · Episiotomy/Laceration  · May use roshni-spray bottle, witch hazel pads and dermaplast spray for comfort  · Use roshni-spray bottle after urinating to cleanse perineal area  · To prevent burning during urination spray roshni-water bottle on labial area   · Pat perineal area dry until episiotomy/laceration is healed  · Continue to use roshni-bottle until bleeding stops as needed  · If have a 2nd degree laceration or greater, a Sitz bath can offer relief from soreness, burning, and inflammation   · Sitz Bath   · Sit in 6 inches of warm water and soak laceration as needed until the laceration heals    VAGINAL CARE AND BLEEDING  · Nothing inside vagina for 6 weeks:   · No sexual intercourse, tampons or douching  · Bleeding may continue for 2-4 weeks. Amount and color may vary  · Soaking 1 pad or more in an hour for several hours is considered heavy bleeding  · Passing large egg sized blood clots can be concerning  · If you feel like you have heavy bleeding or are having increasing amount of blood clots call your Obstetrician immediately  · If you begin feeling faint upon standing, feeling sick to your stomach, have clammy skin, a really fast heartbeat, have chills, start feeling confused, dizzy, sleepy or weak, or feeling like you're going to faint call your Obstetrician immediately    HYPERTENSION   Preeclampsia or gestational hypertension are types of high blood pressure that only pregnant women can get. It is important for you to be aware of symptoms to seek early intervention and treatment. If you have any of these symptoms immediately call your Obstetrician    · Vision changes or blurred vision   · Severe headache or pain that is unrelieved with medication and will not go away  · Persistent pain in upper abdomen or shoulder   · Increased swelling of face, feet, or hands  · Difficulty breathing or shortness of breath at rest  · Urinating less than usual    URINATION AND BOWEL MOVEMENTS  · Eating  "more fiber (bran cereal, fruits, and vegetables) and drinking plenty of fluids will help to avoid constipation  · Urinary frequency and urgency after childbirth is normal  · If you experience any urinary pain, burning or frequency call your provider    BIRTH CONTROL  · It is possible to become pregnant at any time after delivery and while breastfeeding  · Plan to discuss a method of birth control with your physician at your post delivery follow up visit    POSTPARTUM BLUES  During the first few days after birth, you may experience a sense of the \"blues\" which may include impatience, irritability or even crying. These feelings come and go quickly. However, as many as 1 in 10 women experience emotional symptoms known as postpartum depression.     POSTPARTUM DEPRESSION    May start as early as the second or third day after delivery or take several weeks or months to develop. Symptoms of \"blues\" are present, but are more intense: Crying spells; loss of appetite; feelings of hopelessness or loss of control; fear of touching the baby; over concern or no concern at all about the baby; little or no concern about your own appearance/caring for yourself; and/or inability to sleep or excessive sleeping. Contact your Obstetrician if you are experiencing any of these symptoms     PREVENTING SHAKEN BABY  If you are angry or stressed, PUT THE BABY IN THE CRIB, step away, take some deep breaths, and wait until you are calm to care for the baby. DO NOT SHAKE THE BABY. You are not alone, call a supporter for help.  · [facility] 24/7 crisis call line (732-958-9886) or (1-220.356.8196)  · You can also text them, text \"ANSWER\" ([phone])      "

## 2022-05-08 NOTE — DISCHARGE SUMMARY
DATE OF ADMISSION:  2022   DATE OF DISCHARGE:  2022     ADDENDUM     This is an addendum to previously dictated discharge summary.     DISCHARGE DIAGNOSES:  1.  Status post spontaneous vaginal delivery.  2.  Uncomplicated postpartum course.     HISTORY OF PRESENT ILLNESS:  Briefly, this is a 27-year-old  2, para 1   at term with overall reassuring fetal status, who was admitted and did undergo   a normal spontaneous vaginal delivery.  On postpartum day #1, she had met all   discharge criteria; however, the infant was not discharged and thus the   discharge was canceled later on in the evening.  The patient continues to do   well.  She denies any significant lochia rubra.  She is now breastfeeding   better with lactational assistance.  Pain is well controlled with ibuprofen   alone.  As stated above, she continues to do well and again has met discharge   criteria once again and wishes to go home with discharge plan follow up in 6   weeks.     DISCHARGE INSTRUCTIONS:  She is to call with any increased temperature greater   than 100.4, increasing vaginal bleeding, abdominal pain unrelieved with any   p.o. pain medication.  Call with any other questions or concerns.        ______________________________  MD YVETTE Stark/BRIAN    DD:  2022 08:21  DT:  2022 09:00    Job#:  [id number]

## 2022-05-08 NOTE — H&P
DATE OF ADMISSION:  2022     REASON FOR ADMISSION:  Prodromal labor.     HISTORY OF PRESENT ILLNESS:  This is a 27-year-old  2, para 1 at 39-1/7   weeks' gestation based on last menstrual period consistent with a 9-week   ultrasound, now scheduled for augmentation of labor with Pitocin per protocol   given prodromal labor symptoms.  Risks, benefits and alternatives have been   addressed.  She has asked appropriate questions and wished to proceed forward   with admission at this time.     PAST MEDICAL HISTORY:  Noncontributory.     PAST SURGICAL HISTORY:  Tonsillectomy in .     OBSTETRICAL HISTORY:  In 2016 at 39 weeks gestation, after a 16-hour labor,   the patient underwent a spontaneous vaginal delivery of a 7 pound 8 ounce male   infant.  This is her second pregnancy.     SOCIAL HISTORY:  She denies use of any alcohol, tobacco or recreational drug   use.     MEDICATIONS:  Prenatal vitamins.     ALLERGIES:  No known drug allergies.     PHYSICAL EXAMINATION:   VITAL SIGNS:  She is afebrile, hemodynamically stable.  Current vital signs   can be seen in electronic medical record.  HEART:  Regular rate and rhythm.  CHEST:  Clear to auscultation bilaterally.  ABDOMEN:  Soft, gravid, nontender.  PELVIC:  Sterile vaginal exam 1, 50%, -3 station.  EXTREMITIES:  Nontender.     LABORATORY DATA:  Prenatal care labs are all in order.  She is group B strep   negative.  She did have an abnormal 1-hour glucose tolerance test of 152 with   a normal 3-hour glucose tolerance test.  She has also had multiple urinary   tract infections during the course of her pregnancy and is now just currently   finishing up a recent course of antibiotic with the use of cefdinir.     ASSESSMENT AND PLAN:  A 27-year-old  2, para 1 at term with overall   reassuring fetal status with excellent dates with care since 9 weeks gestation   electing to proceed forward with augmentation of labor with Pitocin per   protocol given  excellent dates and a favorable cervix with Pitocin per   protocol.        ______________________________  MD YVETTE Stark/FRANCY/MARICRUZ    DD:  04/28/2022 13:42  DT:  04/28/2022 14:26    Job#:  [id number]

## 2023-06-29 NOTE — PROGRESS NOTES
0700-Report received from Ilsa XIONG and care assumed.  0716-SVHELEN no change.   0722-Dr. Chong updated on pt status. Orders received to discharge patient home with labor precautions.  0733-Pt discharged home with labor precautions and instructions to return if ctx get stronger and more regular, for lof, vb, or decreased fetal movement. Pt verbalized understanding of these instructions and is agreeable to the plan. All questions and concerns answered to her satisfaction.    Pt discharged @ 0733  To: Home  By: Ambulation   English